# Patient Record
Sex: FEMALE | URBAN - METROPOLITAN AREA
[De-identification: names, ages, dates, MRNs, and addresses within clinical notes are randomized per-mention and may not be internally consistent; named-entity substitution may affect disease eponyms.]

---

## 2019-07-30 ENCOUNTER — APPOINTMENT (OUTPATIENT)
Age: 64
Setting detail: DERMATOLOGY
End: 2019-08-01

## 2019-07-30 DIAGNOSIS — D22 MELANOCYTIC NEVI: ICD-10-CM

## 2019-07-30 DIAGNOSIS — L82.1 OTHER SEBORRHEIC KERATOSIS: ICD-10-CM

## 2019-07-30 DIAGNOSIS — L81.4 OTHER MELANIN HYPERPIGMENTATION: ICD-10-CM

## 2019-07-30 PROBLEM — D48.5 NEOPLASM OF UNCERTAIN BEHAVIOR OF SKIN: Status: ACTIVE | Noted: 2019-07-30

## 2019-07-30 PROBLEM — D22.5 MELANOCYTIC NEVI OF TRUNK: Status: ACTIVE | Noted: 2019-07-30

## 2019-07-30 PROCEDURE — 99203 OFFICE O/P NEW LOW 30 MIN: CPT | Mod: 25

## 2019-07-30 PROCEDURE — OTHER COUNSELING: OTHER

## 2019-07-30 PROCEDURE — OTHER REASSURANCE: OTHER

## 2019-07-30 PROCEDURE — 11102 TANGNTL BX SKIN SINGLE LES: CPT

## 2019-07-30 PROCEDURE — OTHER BIOPSY BY SHAVE METHOD: OTHER

## 2019-07-30 ASSESSMENT — LOCATION SIMPLE DESCRIPTION DERM
LOCATION SIMPLE: LEFT EYEBROW
LOCATION SIMPLE: LEFT POSTERIOR UPPER ARM
LOCATION SIMPLE: RIGHT POSTERIOR UPPER ARM
LOCATION SIMPLE: CHEST
LOCATION SIMPLE: LEFT HAND
LOCATION SIMPLE: RIGHT FOREARM
LOCATION SIMPLE: RIGHT UPPER BACK
LOCATION SIMPLE: RIGHT CLAVICULAR SKIN

## 2019-07-30 ASSESSMENT — LOCATION ZONE DERM
LOCATION ZONE: TRUNK
LOCATION ZONE: FACE
LOCATION ZONE: ARM
LOCATION ZONE: HAND
LOCATION ZONE: ARM

## 2019-07-30 ASSESSMENT — LOCATION DETAILED DESCRIPTION DERM
LOCATION DETAILED: LEFT PROXIMAL POSTERIOR UPPER ARM
LOCATION DETAILED: RIGHT SUPERIOR MEDIAL UPPER BACK
LOCATION DETAILED: RIGHT CLAVICULAR SKIN
LOCATION DETAILED: RIGHT MEDIAL SUPERIOR CHEST
LOCATION DETAILED: LEFT RADIAL DORSAL HAND
LOCATION DETAILED: RIGHT SUPERIOR UPPER BACK
LOCATION DETAILED: RIGHT PROXIMAL POSTERIOR UPPER ARM
LOCATION DETAILED: RIGHT DISTAL DORSAL FOREARM
LOCATION DETAILED: LEFT LATERAL EYEBROW

## 2019-07-30 NOTE — HPI: SKIN LESIONS
How Severe Is Your Skin Lesion?: mild
Have Your Skin Lesions Been Treated?: not been treated
Is This A New Presentation, Or A Follow-Up?: Skin Lesions
Additional History: Dr. Hermila Baker. Referral.

## 2019-07-30 NOTE — PROCEDURE: BIOPSY BY SHAVE METHOD
Additional Anesthesia Volume In Cc (Will Not Render If 0): 0
Type Of Destruction Used: Curettage
Render In Bullet Format When Appropriate: No
Path Notes (To The Dermatopathologist): Sent forpath
Notification Instructions: Patient will be notified of biopsy results if abnormal. However, patient instructed to call the office if not contacted within 2 weeks.
Detail Level: Detailed
Electrodesiccation Text: The wound bed was treated with electrodesiccation after the biopsy was performed.
Biopsy Method: Dermablade
Curettage Text: The wound bed was treated with curettage after the biopsy was performed.
Hemostasis: Electrocautery
Size Of Lesion In Cm: 0.4
Electrodesiccation And Curettage Text: The wound bed was treated with electrodesiccation and curettage after the biopsy was performed.
Cryotherapy Text: The wound bed was treated with cryotherapy after the biopsy was performed.
Render Post-Care Instructions In Note?: yes
Wound Care: Polysporin ointment
Silver Nitrate Text: The wound bed was treated with silver nitrate after the biopsy was performed.
Billing Type: Third-Party Bill
Biopsy Type: H and E
Anesthesia Type: 1% lidocaine with epinephrine
Post-Care Instructions: I reviewed with the patient in detail post-care instructions. Patient is to keep the biopsy site dry overnight, and then apply polysporin ointment or vaseline twice daily after cleansing with soap and water until healed.
Anesthesia Volume In Cc (Will Not Render If 0): 2
Depth Of Biopsy: dermis
Consent: Written consent was obtained and risks were reviewed including but not limited to scarring, infection, bleeding, scabbing, incomplete removal, nerve damage and allergy to anesthesia.
Dressing: bandage

## 2020-05-06 ENCOUNTER — APPOINTMENT (OUTPATIENT)
Dept: URBAN - METROPOLITAN AREA CLINIC 270 | Age: 65
Setting detail: DERMATOLOGY
End: 2020-05-07

## 2020-05-06 DIAGNOSIS — L23.7 ALLERGIC CONTACT DERMATITIS DUE TO PLANTS, EXCEPT FOOD: ICD-10-CM

## 2020-05-06 PROCEDURE — 96372 THER/PROPH/DIAG INJ SC/IM: CPT

## 2020-05-06 PROCEDURE — OTHER PRESCRIPTION: OTHER

## 2020-05-06 PROCEDURE — OTHER ADDITIONAL NOTES: OTHER

## 2020-05-06 PROCEDURE — 99213 OFFICE O/P EST LOW 20 MIN: CPT | Mod: 25

## 2020-05-06 PROCEDURE — OTHER SEPARATE AND IDENTIFIABLE DOCUMENTATION: OTHER

## 2020-05-06 PROCEDURE — OTHER COUNSELING: OTHER

## 2020-05-06 PROCEDURE — OTHER INTRAMUSCULAR KENALOG: OTHER

## 2020-05-06 RX ORDER — CLOBETASOL PROPIONATE 0.5 MG/G
CREAM TOPICAL BID
Qty: 1 | Refills: 3 | Status: ERX | COMMUNITY
Start: 2020-05-06

## 2020-05-06 ASSESSMENT — LOCATION ZONE DERM: LOCATION ZONE: SHOULDER

## 2020-05-06 ASSESSMENT — LOCATION DETAILED DESCRIPTION DERM: LOCATION DETAILED: RIGHT DELTOID

## 2020-05-06 ASSESSMENT — SEVERITY ASSESSMENT 2020: SEVERITY 2020: MODERATE

## 2020-05-06 ASSESSMENT — LOCATION SIMPLE DESCRIPTION DERM: LOCATION SIMPLE: RIGHT DELTOID

## 2020-05-06 NOTE — PROCEDURE: ADDITIONAL NOTES
Additional Notes: Instructed patient to stop using all the creams she brought in including: witch hazel, permethrin, cortisone 10, calamine lotion, critter cream, and more. All questions answered. Patient verbalized understanding.
Detail Level: Simple

## 2020-05-06 NOTE — HPI: RASH (ECZEMA)
How Severe Is Your Eczema?: mild
Is This A New Presentation, Or A Follow-Up?: Rash
Additional History: Patient has been using numerous otc anti itch creams with no improvement

## 2020-05-06 NOTE — PROCEDURE: INTRAMUSCULAR KENALOG
Administered By (Optional): MA
Detail Level: None
Consent: The risks of atrophy were reviewed with the patient.
Add Option For Additional Mediation: No
Kenalog Preparation: kenalog
Concentration (Mg/Ml): 40.0
Concentration (Mg/Ml) Of Additional Medication: 2.5
Treatment Number (Optional): 1

## 2020-05-08 ENCOUNTER — APPOINTMENT (OUTPATIENT)
Dept: URBAN - METROPOLITAN AREA CLINIC 270 | Age: 65
Setting detail: DERMATOLOGY
End: 2020-05-08

## 2020-05-08 DIAGNOSIS — L23.7 ALLERGIC CONTACT DERMATITIS DUE TO PLANTS, EXCEPT FOOD: ICD-10-CM

## 2020-05-08 PROCEDURE — OTHER PRESCRIPTION: OTHER

## 2020-05-08 PROCEDURE — OTHER ADDITIONAL NOTES: OTHER

## 2020-05-08 PROCEDURE — OTHER INTRAMUSCULAR KENALOG: OTHER

## 2020-05-08 PROCEDURE — 99212 OFFICE O/P EST SF 10 MIN: CPT | Mod: 25

## 2020-05-08 PROCEDURE — OTHER SEPARATE AND IDENTIFIABLE DOCUMENTATION: OTHER

## 2020-05-08 PROCEDURE — OTHER COUNSELING: OTHER

## 2020-05-08 PROCEDURE — 96372 THER/PROPH/DIAG INJ SC/IM: CPT

## 2020-05-08 RX ORDER — CLOBETASOL PROPIONATE 0.5 MG/G
CREAM TOPICAL BID
Qty: 1 | Refills: 3 | Status: ERX

## 2020-05-08 ASSESSMENT — LOCATION DETAILED DESCRIPTION DERM: LOCATION DETAILED: LEFT DELTOID

## 2020-05-08 ASSESSMENT — LOCATION ZONE DERM: LOCATION ZONE: SHOULDER

## 2020-05-08 ASSESSMENT — LOCATION SIMPLE DESCRIPTION DERM: LOCATION SIMPLE: LEFT DELTOID

## 2020-05-08 NOTE — PROCEDURE: ADDITIONAL NOTES
Additional Notes: Patient states her glucose has increased. Discussed that she should not take oral prednisone today. Instructed patient to decrease prednisone dose to 20mg x 2 days and then 10mg x 2 days. Will re-evaluate Tuesday. Also instructed patient to call PCP about insulin management. Patient verbalized understanding and made an appointment. Discussed again that steroids increase blood sugar and they will need to adjust.
Detail Level: Simple

## 2020-05-08 NOTE — PROCEDURE: INTRAMUSCULAR KENALOG
Total Volume (Ccs): 0.5
Add Option For Additional Mediation: No
Consent: The risks of atrophy were reviewed with the patient.
Concentration (Mg/Ml): 40.0
Kenalog Preparation: kenalog
Treatment Number (Optional): 2
Detail Level: None
Concentration (Mg/Ml) Of Additional Medication: 2.5
Administered By (Optional): MA

## 2020-05-12 ENCOUNTER — APPOINTMENT (OUTPATIENT)
Dept: URBAN - METROPOLITAN AREA CLINIC 271 | Age: 65
Setting detail: DERMATOLOGY
End: 2020-05-12

## 2020-05-12 DIAGNOSIS — L23.7 ALLERGIC CONTACT DERMATITIS DUE TO PLANTS, EXCEPT FOOD: ICD-10-CM

## 2020-05-12 PROCEDURE — OTHER COUNSELING: OTHER

## 2020-05-12 PROCEDURE — OTHER SEPARATE AND IDENTIFIABLE DOCUMENTATION: OTHER

## 2020-05-12 PROCEDURE — OTHER ADDITIONAL NOTES: OTHER

## 2020-05-12 PROCEDURE — OTHER INTRAMUSCULAR KENALOG: OTHER

## 2020-05-12 PROCEDURE — 96372 THER/PROPH/DIAG INJ SC/IM: CPT

## 2020-05-12 PROCEDURE — 99213 OFFICE O/P EST LOW 20 MIN: CPT | Mod: 25

## 2020-05-12 ASSESSMENT — LOCATION DETAILED DESCRIPTION DERM: LOCATION DETAILED: RIGHT DELTOID

## 2020-05-12 ASSESSMENT — LOCATION SIMPLE DESCRIPTION DERM: LOCATION SIMPLE: RIGHT DELTOID

## 2020-05-12 ASSESSMENT — LOCATION ZONE DERM: LOCATION ZONE: SHOULDER

## 2020-05-12 NOTE — PROCEDURE: ADDITIONAL NOTES
Detail Level: Simple
Additional Notes: Patient states her glucose has returned to normal. Discussed that she should not take oral prednisone today. Discussed again that steroids increase blood sugar and to follow up with PCP if still increased. She states her blood sugar is WNL,

## 2020-05-12 NOTE — PROCEDURE: INTRAMUSCULAR KENALOG
Treatment Number (Optional): 3
Add Option For Additional Mediation: No
Administered By (Optional): RN
Detail Level: None
Kenalog Preparation: kenalog
Concentration (Mg/Ml) Of Additional Medication: 2.5
Concentration (Mg/Ml): 40.0
Consent: The risks of atrophy were reviewed with the patient.
Total Volume (Ccs): 0.2

## 2021-01-19 ENCOUNTER — OFFICE (OUTPATIENT)
Dept: URBAN - METROPOLITAN AREA CLINIC 107 | Facility: CLINIC | Age: 66
End: 2021-01-19
Payer: MEDICARE

## 2021-01-19 VITALS
WEIGHT: 213 LBS | SYSTOLIC BLOOD PRESSURE: 106 MMHG | DIASTOLIC BLOOD PRESSURE: 62 MMHG | HEART RATE: 58 BPM | HEIGHT: 65 IN

## 2021-01-19 DIAGNOSIS — K59.00 CONSTIPATION, UNSPECIFIED: ICD-10-CM

## 2021-01-19 DIAGNOSIS — K21.9 GASTRO-ESOPHAGEAL REFLUX DISEASE WITHOUT ESOPHAGITIS: ICD-10-CM

## 2021-01-19 DIAGNOSIS — K76.0 FATTY (CHANGE OF) LIVER, NOT ELSEWHERE CLASSIFIED: ICD-10-CM

## 2021-01-19 PROCEDURE — 99204 OFFICE O/P NEW MOD 45 MIN: CPT | Performed by: NURSE PRACTITIONER

## 2021-05-27 ENCOUNTER — OFFICE (OUTPATIENT)
Dept: URBAN - METROPOLITAN AREA CLINIC 72 | Facility: CLINIC | Age: 66
End: 2021-05-27
Payer: MEDICARE

## 2021-05-27 VITALS
WEIGHT: 217 LBS | HEART RATE: 55 BPM | DIASTOLIC BLOOD PRESSURE: 67 MMHG | SYSTOLIC BLOOD PRESSURE: 108 MMHG | HEIGHT: 65 IN

## 2021-05-27 DIAGNOSIS — K74.00 HEPATIC FIBROSIS, UNSPECIFIED: ICD-10-CM

## 2021-05-27 DIAGNOSIS — K59.00 CONSTIPATION, UNSPECIFIED: ICD-10-CM

## 2021-05-27 DIAGNOSIS — K76.0 FATTY (CHANGE OF) LIVER, NOT ELSEWHERE CLASSIFIED: ICD-10-CM

## 2021-05-27 DIAGNOSIS — K21.9 GASTRO-ESOPHAGEAL REFLUX DISEASE WITHOUT ESOPHAGITIS: ICD-10-CM

## 2021-05-27 DIAGNOSIS — Z98.84 BARIATRIC SURGERY STATUS: ICD-10-CM

## 2021-05-27 PROCEDURE — 99214 OFFICE O/P EST MOD 30 MIN: CPT | Performed by: NURSE PRACTITIONER

## 2022-05-03 ENCOUNTER — APPOINTMENT (OUTPATIENT)
Dept: URBAN - METROPOLITAN AREA CLINIC 270 | Age: 67
Setting detail: DERMATOLOGY
End: 2022-05-06

## 2022-05-03 DIAGNOSIS — N76.4 ABSCESS OF VULVA: ICD-10-CM

## 2022-05-03 PROCEDURE — OTHER INCISION AND DRAINAGE: OTHER

## 2022-05-03 PROCEDURE — OTHER MIPS QUALITY: OTHER

## 2022-05-03 PROCEDURE — OTHER COUNSELING: OTHER

## 2022-05-03 PROCEDURE — OTHER SEPARATE AND IDENTIFIABLE DOCUMENTATION: OTHER

## 2022-05-03 PROCEDURE — 99213 OFFICE O/P EST LOW 20 MIN: CPT | Mod: 25

## 2022-05-03 PROCEDURE — OTHER ORDER TESTS: OTHER

## 2022-05-03 PROCEDURE — 56405 I & D OF VULVA/PERINEUM: CPT

## 2022-05-03 PROCEDURE — OTHER PRESCRIPTION: OTHER

## 2022-05-03 RX ORDER — CLINDAMYCIN PHOSPHATE 10 MG/ML
SOLUTION TOPICAL
Qty: 60 | Refills: 0 | Status: ERX | COMMUNITY
Start: 2022-05-03

## 2022-05-03 RX ORDER — CEPHALEXIN 500 MG/1
TABLET ORAL QID
Qty: 42 | Refills: 0 | Status: ERX | COMMUNITY
Start: 2022-05-03

## 2022-05-03 ASSESSMENT — LOCATION SIMPLE DESCRIPTION DERM: LOCATION SIMPLE: GROIN

## 2022-05-03 ASSESSMENT — LOCATION DETAILED DESCRIPTION DERM: LOCATION DETAILED: RIGHT SUPRAPUBIC SKIN

## 2022-05-03 ASSESSMENT — LOCATION ZONE DERM: LOCATION ZONE: TRUNK

## 2022-05-03 NOTE — PROCEDURE: INCISION AND DRAINAGE
Wound Care: Petrolatum
Post-Care Instructions: I reviewed with the patient in detail post-care instructions. Patient should keep wound covered and call the office should any redness, pain, swelling or worsening occur.
Epidermal Sutures: 4-0 Ethilon
Curette: No
Size Of Lesion In Cm (Optional But May Be Required For Some Insurances): 0
Preparation Text: The area was prepped in the usual clean fashion.
Method: 11 blade
Lesion Type: Abscess
Suture Text: The incision was partially closed with
Detail Level: Detailed
Anesthesia Type: 1% lidocaine with epinephrine
Consent was obtained and risks were reviewed including but not limited to delayed wound healing, infection, need for multiple I and D's, and pain.
Dressing: dry sterile dressing
Curette Text (Optional): After the contents were expressed a curette was used to partially remove the cyst wall.
Epidermal Closure: simple interrupted

## 2022-05-03 NOTE — PROCEDURE: MIPS QUALITY
Quality 431: Preventive Care And Screening: Unhealthy Alcohol Use - Screening: Patient not identified as an unhealthy alcohol user when screened for unhealthy alcohol use using a systematic screening method
Quality 265: Biopsy Follow-Up: Biopsy results reviewed, communicated, tracked, and documented
Quality 130: Documentation Of Current Medications In The Medical Record: Current Medications Documented
Quality 111:Pneumonia Vaccination Status For Older Adults: Pneumococcal Vaccination not Administered or Previously Received, Reason not Otherwise Specified
Quality 226: Preventive Care And Screening: Tobacco Use: Screening And Cessation Intervention: Patient screened for tobacco use and is an ex/non-smoker
Detail Level: Detailed
Quality 110: Preventive Care And Screening: Influenza Immunization: Influenza Immunization not Administered for Documented Reasons.

## 2022-06-02 ENCOUNTER — APPOINTMENT (OUTPATIENT)
Dept: URBAN - METROPOLITAN AREA CLINIC 270 | Age: 67
Setting detail: DERMATOLOGY
End: 2022-06-06

## 2022-06-02 DIAGNOSIS — N76.4 ABSCESS OF VULVA: ICD-10-CM

## 2022-06-02 PROCEDURE — 99213 OFFICE O/P EST LOW 20 MIN: CPT

## 2022-06-02 PROCEDURE — OTHER COUNSELING: OTHER

## 2022-06-02 ASSESSMENT — LOCATION DETAILED DESCRIPTION DERM: LOCATION DETAILED: PERIUMBILICAL SKIN

## 2022-06-02 ASSESSMENT — LOCATION ZONE DERM: LOCATION ZONE: TRUNK

## 2022-06-02 ASSESSMENT — LOCATION SIMPLE DESCRIPTION DERM: LOCATION SIMPLE: ABDOMEN

## 2022-10-20 NOTE — HPI: SKIN LESIONS
How Severe Is Your Skin Lesion?: mild
Have Your Skin Lesions Been Treated?: not been treated
Is This A New Presentation, Or A Follow-Up?: Growth
Yes

## 2023-04-04 ENCOUNTER — APPOINTMENT (OUTPATIENT)
Dept: URBAN - METROPOLITAN AREA CLINIC 270 | Age: 68
Setting detail: DERMATOLOGY
End: 2023-04-05

## 2023-04-04 DIAGNOSIS — L29.8 OTHER PRURITUS: ICD-10-CM

## 2023-04-04 DIAGNOSIS — L23.7 ALLERGIC CONTACT DERMATITIS DUE TO PLANTS, EXCEPT FOOD: ICD-10-CM

## 2023-04-04 PROCEDURE — 96372 THER/PROPH/DIAG INJ SC/IM: CPT

## 2023-04-04 PROCEDURE — 99213 OFFICE O/P EST LOW 20 MIN: CPT | Mod: 25

## 2023-04-04 PROCEDURE — OTHER PRESCRIPTION: OTHER

## 2023-04-04 PROCEDURE — OTHER COUNSELING: OTHER

## 2023-04-04 PROCEDURE — OTHER INTRAMUSCULAR KENALOG: OTHER

## 2023-04-04 PROCEDURE — OTHER REASSURANCE: OTHER

## 2023-04-04 PROCEDURE — OTHER SEPARATE AND IDENTIFIABLE DOCUMENTATION: OTHER

## 2023-04-04 RX ORDER — PREDNISONE 10 MG/1
TABLET ORAL
Qty: 40 | Refills: 0 | Status: ERX | COMMUNITY
Start: 2023-04-04

## 2023-04-04 RX ORDER — TRIAMCINOLONE ACETONIDE 1 MG/G
CREAM TOPICAL BID
Qty: 454 | Refills: 0 | Status: ERX | COMMUNITY
Start: 2023-04-04

## 2023-04-04 ASSESSMENT — LOCATION DETAILED DESCRIPTION DERM
LOCATION DETAILED: RIGHT DISTAL PRETIBIAL REGION
LOCATION DETAILED: LEFT VENTRAL LATERAL PROXIMAL FOREARM
LOCATION DETAILED: RIGHT VENTRAL PROXIMAL FOREARM
LOCATION DETAILED: RIGHT PROXIMAL PRETIBIAL REGION
LOCATION DETAILED: LEFT BUTTOCK

## 2023-04-04 ASSESSMENT — LOCATION SIMPLE DESCRIPTION DERM
LOCATION SIMPLE: RIGHT PRETIBIAL REGION
LOCATION SIMPLE: LEFT BUTTOCK
LOCATION SIMPLE: LEFT FOREARM
LOCATION SIMPLE: RIGHT FOREARM

## 2023-04-04 ASSESSMENT — LOCATION ZONE DERM
LOCATION ZONE: ARM
LOCATION ZONE: TRUNK
LOCATION ZONE: LEG

## 2024-05-22 ENCOUNTER — OFFICE (OUTPATIENT)
Dept: URBAN - METROPOLITAN AREA CLINIC 84 | Facility: CLINIC | Age: 69
End: 2024-05-22
Payer: MEDICARE

## 2024-05-22 VITALS
WEIGHT: 220 LBS | SYSTOLIC BLOOD PRESSURE: 110 MMHG | DIASTOLIC BLOOD PRESSURE: 70 MMHG | HEIGHT: 65 IN | HEART RATE: 65 BPM | OXYGEN SATURATION: 96 %

## 2024-05-22 DIAGNOSIS — R14.3 FLATULENCE: ICD-10-CM

## 2024-05-22 DIAGNOSIS — R11.2 NAUSEA WITH VOMITING, UNSPECIFIED: ICD-10-CM

## 2024-05-22 DIAGNOSIS — K59.00 CONSTIPATION, UNSPECIFIED: ICD-10-CM

## 2024-05-22 PROCEDURE — 99214 OFFICE O/P EST MOD 30 MIN: CPT | Performed by: INTERNAL MEDICINE
